# Patient Record
Sex: FEMALE | Race: WHITE | NOT HISPANIC OR LATINO | URBAN - METROPOLITAN AREA
[De-identification: names, ages, dates, MRNs, and addresses within clinical notes are randomized per-mention and may not be internally consistent; named-entity substitution may affect disease eponyms.]

---

## 2020-10-15 ENCOUNTER — OFFICE VISIT (OUTPATIENT)
Dept: URBAN - METROPOLITAN AREA CLINIC 68 | Facility: CLINIC | Age: 65
End: 2020-10-15

## 2021-03-04 ENCOUNTER — OFFICE VISIT (OUTPATIENT)
Dept: URBAN - METROPOLITAN AREA CLINIC 68 | Facility: CLINIC | Age: 66
End: 2021-03-04

## 2021-04-01 ENCOUNTER — OFFICE VISIT (OUTPATIENT)
Dept: URBAN - METROPOLITAN AREA CLINIC 68 | Facility: CLINIC | Age: 66
End: 2021-04-01

## 2021-08-26 ENCOUNTER — OFFICE VISIT (OUTPATIENT)
Dept: URBAN - METROPOLITAN AREA CLINIC 68 | Facility: CLINIC | Age: 66
End: 2021-08-26

## 2021-09-23 ENCOUNTER — OFFICE VISIT (OUTPATIENT)
Dept: URBAN - METROPOLITAN AREA CLINIC 68 | Facility: CLINIC | Age: 66
End: 2021-09-23

## 2022-06-04 ENCOUNTER — TELEPHONE ENCOUNTER (OUTPATIENT)
Dept: URBAN - METROPOLITAN AREA CLINIC 68 | Facility: CLINIC | Age: 67
End: 2022-06-04

## 2022-06-04 RX ORDER — METRONIDAZOLE 500 MG/1
METRONIDAZOLE( 500MG ORAL   ) INACTIVE -HX ENTRY TABLET ORAL
OUTPATIENT
Start: 2021-07-27 | End: 2019-08-01

## 2022-06-04 RX ORDER — PANTOPRAZOLE SODIUM 40 MG/1
PANTOPRAZOLE SODIUM( 40MG ORAL   ) INACTIVE -HX ENTRY TABLET, DELAYED RELEASE ORAL
OUTPATIENT
Start: 2020-09-24 | End: 2021-07-21

## 2022-06-04 RX ORDER — ATENOLOL 25 MG/1
ATENOLOL( 25MG ORAL   ) INACTIVE -HX ENTRY TABLET ORAL
OUTPATIENT
Start: 2020-08-13 | End: 2021-07-21

## 2022-06-04 RX ORDER — CITALOPRAM 40 MG/1
CITALOPRAM HYDROBROMIDE( 40MG ORAL   ) INACTIVE -HX ENTRY TABLET ORAL
OUTPATIENT
Start: 2021-03-16 | End: 2021-07-21

## 2022-06-04 RX ORDER — PREDNISOLONE ACETATE 10 MG/ML
PREDNISOLONE ACETATE( 1% OPHTHALMIC   ) INACTIVE -HX ENTRY SUSPENSION/ DROPS OPHTHALMIC
OUTPATIENT
Start: 2021-07-27 | End: 2019-08-01

## 2022-06-04 RX ORDER — TRIAMTERENE AND HYDROCHLOROTHIAZIDE 37.5; 25 MG/1; MG/1
TRIAMTERENE-HCTZ( 37.5-25MG ORAL   ) INACTIVE -HX ENTRY CAPSULE ORAL
OUTPATIENT
Start: 2021-07-27 | End: 2020-12-03

## 2022-06-04 RX ORDER — BROMFENAC SODIUM 0.7 MG/ML
PROLENSA( 0.07% OPHTHALMIC   ) INACTIVE -HX ENTRY SOLUTION/ DROPS OPHTHALMIC
OUTPATIENT
Start: 2021-07-27 | End: 2019-08-01

## 2022-06-04 RX ORDER — CYCLOBENZAPRINE HYDROCHLORIDE 10 MG/10MG
CYCLOBENZAPRINE HCL( 10MG ORAL   ) INACTIVE -HX ENTRY TABLET ORAL
OUTPATIENT
Start: 2021-07-27 | End: 2020-12-03

## 2022-06-04 RX ORDER — AZELASTINE 137 UG/1
AZELASTINE HCL( 137MCG/SPRAY NASAL   ) INACTIVE -HX ENTRY SPRAY, METERED NASAL
OUTPATIENT
Start: 2020-08-13 | End: 2021-07-21

## 2022-06-04 RX ORDER — CIPROFLOXACIN HYDROCHLORIDE 500 MG/1
CIPROFLOXACIN HCL( 500MG ORAL   ) INACTIVE -HX ENTRY TABLET, FILM COATED ORAL
OUTPATIENT
Start: 2021-07-27 | End: 2019-08-01

## 2022-06-04 RX ORDER — INFLUENZA VIRUS VACCINE 15; 15; 15; 15 UG/.5ML; UG/.5ML; UG/.5ML; UG/.5ML
FLULAVAL QUADRIVALENT( 0.5ML INTRAMUSCULAR   ) INACTIVE -HX ENTRY SUSPENSION INTRAMUSCULAR
OUTPATIENT
Start: 2021-07-27 | End: 2019-08-01

## 2022-06-04 RX ORDER — MONTELUKAST SODIUM 10 MG/1
SINGULAIR( 10MG ORAL   ) INACTIVE -HX ENTRY TABLET, FILM COATED ORAL
OUTPATIENT
Start: 2021-03-16 | End: 2021-07-21

## 2022-06-04 RX ORDER — TRAMADOL HYDROCHLORIDE 50 MG/1
TRAMADOL HCL( 50MG ORAL   ) INACTIVE -HX ENTRY TABLET ORAL
OUTPATIENT
Start: 2020-09-24 | End: 2021-07-21

## 2022-06-04 RX ORDER — INFLUENZA A VIRUS A/VICTORIA/2570/2019 IVR-215 (H1N1) ANTIGEN (PROPIOLACTONE INACTIVATED), INFLUENZA A VIRUS A/DARWIN/6/2021 IVR-227 (H3N2) ANTIGEN (PROPIOLACTONE INACTIVATED), INFLUENZA B VIRUS B/AUSTRIA/1359417/2021 BVR-26 ANTIGEN (PROPIOLACTONE INACTIVATED), INFLUENZA B VIRUS B/PHUKET/3073/2013 BVR-1B ANTIGEN (PROPIOLACTONE INACTIVATED) 15; 15; 15; 15 UG/.5ML; UG/.5ML; UG/.5ML; UG/.5ML
AFLURIA QUADRIVALENT( 0.5ML INTRAMUSCULAR   ) INACTIVE -HX ENTRY INJECTION, SOLUTION INTRAMUSCULAR
OUTPATIENT
Start: 2021-07-27 | End: 2020-05-14

## 2022-06-04 RX ORDER — GATIFLOXACIN 5 MG/ML
GATIFLOXACIN( 0.5% OPHTHALMIC   ) INACTIVE -HX ENTRY SOLUTION/ DROPS OPHTHALMIC
OUTPATIENT
Start: 2021-07-27 | End: 2019-08-01

## 2022-06-04 RX ORDER — LISINOPRIL 10 MG/1
LISINOPRIL( 10MG ORAL   ) INACTIVE -HX ENTRY TABLET ORAL
OUTPATIENT
Start: 2020-09-24 | End: 2021-07-21

## 2022-06-05 ENCOUNTER — TELEPHONE ENCOUNTER (OUTPATIENT)
Dept: URBAN - METROPOLITAN AREA CLINIC 68 | Facility: CLINIC | Age: 67
End: 2022-06-05

## 2022-06-05 RX ORDER — LEVOTHYROXINE SODIUM 0.03 MG/1
LEVOTHYROXINE SODIUM( 25MCG ORAL   ) ACTIVE -HX ENTRY TABLET ORAL
Status: ACTIVE | COMMUNITY
Start: 2021-01-22

## 2022-06-05 RX ORDER — BUPROPION HCL 300 MG
WELLBUTRIN XL( 300MG ORAL   ) ACTIVE -HX ENTRY TABLET, EXTENDED RELEASE 24 HR ORAL
Status: ACTIVE | COMMUNITY
Start: 2021-03-16

## 2022-06-05 RX ORDER — CITALOPRAM 20 MG/1
CITALOPRAM HYDROBROMIDE( 20MG ORAL   ) ACTIVE -HX ENTRY TABLET ORAL
Status: ACTIVE | COMMUNITY
Start: 2021-07-27

## 2022-06-05 RX ORDER — ZOLPIDEM TARTRATE 10 MG/1
ZOLPIDEM TARTRATE( 10MG ORAL   ) ACTIVE -HX ENTRY TABLET, FILM COATED ORAL
Status: ACTIVE | COMMUNITY
Start: 2021-07-27

## 2022-06-05 RX ORDER — ONDANSETRON 8 MG/1
ONDANSETRON( 8MG ORAL   ) ACTIVE -HX ENTRY TABLET, ORALLY DISINTEGRATING ORAL
Status: ACTIVE | COMMUNITY
Start: 2021-07-21

## 2022-06-25 ENCOUNTER — TELEPHONE ENCOUNTER (OUTPATIENT)
Age: 67
End: 2022-06-25

## 2022-06-25 RX ORDER — MONTELUKAST SODIUM 10 MG/1
SINGULAIR( 10MG ORAL   ) INACTIVE -HX ENTRY TABLET, FILM COATED ORAL
OUTPATIENT
Start: 2021-03-16 | End: 2021-07-21

## 2022-06-25 RX ORDER — INFLUENZA VIRUS VACCINE 15; 15; 15; 15 UG/.5ML; UG/.5ML; UG/.5ML; UG/.5ML
FLULAVAL QUADRIVALENT( 0.5ML INTRAMUSCULAR   ) INACTIVE -HX ENTRY SUSPENSION INTRAMUSCULAR
OUTPATIENT
Start: 2021-07-27 | End: 2019-08-01

## 2022-06-25 RX ORDER — PREDNISOLONE ACETATE 10 MG/ML
PREDNISOLONE ACETATE( 1% OPHTHALMIC   ) INACTIVE -HX ENTRY SUSPENSION/ DROPS OPHTHALMIC
OUTPATIENT
Start: 2021-07-27 | End: 2019-08-01

## 2022-06-25 RX ORDER — CITALOPRAM 40 MG/1
CITALOPRAM HYDROBROMIDE( 40MG ORAL   ) INACTIVE -HX ENTRY TABLET ORAL
OUTPATIENT
Start: 2021-03-16 | End: 2021-07-21

## 2022-06-25 RX ORDER — GATIFLOXACIN 5 MG/ML
GATIFLOXACIN( 0.5% OPHTHALMIC   ) INACTIVE -HX ENTRY SOLUTION/ DROPS OPHTHALMIC
OUTPATIENT
Start: 2021-07-27 | End: 2019-08-01

## 2022-06-25 RX ORDER — PANTOPRAZOLE 40 MG/1
PANTOPRAZOLE SODIUM( 40MG ORAL   ) INACTIVE -HX ENTRY TABLET, DELAYED RELEASE ORAL
OUTPATIENT
Start: 2020-09-24 | End: 2021-07-21

## 2022-06-25 RX ORDER — TRIAMTERENE AND HYDROCHLOROTHIAZIDE 37.5; 25 MG/1; MG/1
TRIAMTERENE-HCTZ( 37.5-25MG ORAL   ) INACTIVE -HX ENTRY CAPSULE ORAL
OUTPATIENT
Start: 2021-07-27 | End: 2020-12-03

## 2022-06-25 RX ORDER — BROMFENAC SODIUM 0.7 MG/ML
PROLENSA( 0.07% OPHTHALMIC   ) INACTIVE -HX ENTRY SOLUTION/ DROPS OPHTHALMIC
OUTPATIENT
Start: 2021-07-27 | End: 2019-08-01

## 2022-06-25 RX ORDER — CIPROFLOXACIN HYDROCHLORIDE 500 MG/1
CIPROFLOXACIN HCL( 500MG ORAL   ) INACTIVE -HX ENTRY TABLET, FILM COATED ORAL
OUTPATIENT
Start: 2021-07-27 | End: 2019-08-01

## 2022-06-25 RX ORDER — LISINOPRIL 10 MG/1
LISINOPRIL( 10MG ORAL   ) INACTIVE -HX ENTRY TABLET ORAL
OUTPATIENT
Start: 2020-09-24 | End: 2021-07-21

## 2022-06-25 RX ORDER — METRONIDAZOLE 500 MG/1
METRONIDAZOLE( 500MG ORAL   ) INACTIVE -HX ENTRY TABLET ORAL
OUTPATIENT
Start: 2021-07-27 | End: 2019-08-01

## 2022-06-25 RX ORDER — AZELASTINE HYDROCHLORIDE 137 UG/1
AZELASTINE HCL( 137MCG/SPRAY NASAL   ) INACTIVE -HX ENTRY SPRAY, METERED NASAL
OUTPATIENT
Start: 2020-08-13 | End: 2021-07-21

## 2022-06-25 RX ORDER — TRAMADOL HYDROCHLORIDE 50 MG/1
TRAMADOL HCL( 50MG ORAL   ) INACTIVE -HX ENTRY TABLET ORAL
OUTPATIENT
Start: 2020-09-24 | End: 2021-07-21

## 2022-06-25 RX ORDER — ATENOLOL 25 MG/1
ATENOLOL( 25MG ORAL   ) INACTIVE -HX ENTRY TABLET ORAL
OUTPATIENT
Start: 2020-08-13 | End: 2021-07-21

## 2022-06-25 RX ORDER — INFLUENZA A VIRUS A/VICTORIA/2570/2019 IVR-215 (H1N1) ANTIGEN (PROPIOLACTONE INACTIVATED), INFLUENZA A VIRUS A/DARWIN/6/2021 IVR-227 (H3N2) ANTIGEN (PROPIOLACTONE INACTIVATED), INFLUENZA B VIRUS B/AUSTRIA/1359417/2021 BVR-26 ANTIGEN (PROPIOLACTONE INACTIVATED), INFLUENZA B VIRUS B/PHUKET/3073/2013 BVR-1B ANTIGEN (PROPIOLACTONE INACTIVATED) 15; 15; 15; 15 UG/.5ML; UG/.5ML; UG/.5ML; UG/.5ML
AFLURIA QUADRIVALENT( 0.5ML INTRAMUSCULAR   ) INACTIVE -HX ENTRY INJECTION, SOLUTION INTRAMUSCULAR
OUTPATIENT
Start: 2021-07-27 | End: 2020-05-14

## 2022-06-25 RX ORDER — CYCLOBENZAPRINE HYDROCHLORIDE 10 MG/1
CYCLOBENZAPRINE HCL( 10MG ORAL   ) INACTIVE -HX ENTRY TABLET, FILM COATED ORAL
OUTPATIENT
Start: 2021-07-27 | End: 2020-12-03

## 2022-06-26 ENCOUNTER — TELEPHONE ENCOUNTER (OUTPATIENT)
Age: 67
End: 2022-06-26

## 2022-06-26 RX ORDER — ZOLPIDEM TARTRATE 10 MG/1
ZOLPIDEM TARTRATE( 10MG ORAL   ) ACTIVE -HX ENTRY TABLET ORAL
Status: ACTIVE | COMMUNITY
Start: 2021-07-27

## 2022-06-26 RX ORDER — ONDANSETRON 8 MG/1
ONDANSETRON( 8MG ORAL   ) ACTIVE -HX ENTRY TABLET, ORALLY DISINTEGRATING ORAL
Status: ACTIVE | COMMUNITY
Start: 2021-07-21

## 2022-06-26 RX ORDER — CITALOPRAM 20 MG/1
CITALOPRAM HYDROBROMIDE( 20MG ORAL   ) ACTIVE -HX ENTRY TABLET ORAL
Status: ACTIVE | COMMUNITY
Start: 2021-07-27

## 2022-06-26 RX ORDER — LEVOTHYROXINE SODIUM 25 UG/1
LEVOTHYROXINE SODIUM( 25MCG ORAL   ) ACTIVE -HX ENTRY TABLET ORAL
Status: ACTIVE | COMMUNITY
Start: 2021-01-22

## 2022-06-26 RX ORDER — BUPROPION HCL 300 MG
WELLBUTRIN XL( 300MG ORAL   ) ACTIVE -HX ENTRY TABLET, EXTENDED RELEASE 24 HR ORAL
Status: ACTIVE | COMMUNITY
Start: 2021-03-16

## 2025-06-07 ENCOUNTER — APPOINTMENT (OUTPATIENT)
Dept: CT IMAGING | Facility: HOSPITAL | Age: 70
End: 2025-06-07
Payer: MEDICARE

## 2025-06-07 ENCOUNTER — HOSPITAL ENCOUNTER (EMERGENCY)
Facility: HOSPITAL | Age: 70
Discharge: HOME | End: 2025-06-07
Attending: EMERGENCY MEDICINE
Payer: MEDICARE

## 2025-06-07 VITALS
RESPIRATION RATE: 14 BRPM | BODY MASS INDEX: 20.09 KG/M2 | HEIGHT: 66 IN | DIASTOLIC BLOOD PRESSURE: 79 MMHG | OXYGEN SATURATION: 97 % | SYSTOLIC BLOOD PRESSURE: 139 MMHG | HEART RATE: 61 BPM | WEIGHT: 125 LBS | TEMPERATURE: 98 F

## 2025-06-07 DIAGNOSIS — S09.90XA CLOSED HEAD INJURY WITHOUT LOSS OF CONSCIOUSNESS, INITIAL ENCOUNTER: Primary | ICD-10-CM

## 2025-06-07 PROCEDURE — 70450 CT HEAD/BRAIN W/O DYE: CPT

## 2025-06-07 PROCEDURE — 72125 CT NECK SPINE W/O DYE: CPT

## 2025-06-07 PROCEDURE — 99284 EMERGENCY DEPT VISIT MOD MDM: CPT

## 2025-06-08 NOTE — ED PROVIDER NOTES
MD ATTESTATION NOTE    SHARED VISIT: This visit was performed by BOTH a physician and an APC. The substantive portion of the medical decision making was performed by this attesting physician who made or approved the management plan and takes responsibility for patient management. All studies in the APC note (if performed) were independently interpreted by me.     The [facility]ED[facility] and I have discussed this patient's history, physical exam, and treatment plan.  I have reviewed the documentation and affirm the documentation and agree with the treatment and plan.  The attached note describes my personal findings.      Independent Historians: Patient and family    A complete HPI/ROS/PMH/PSH/SH/FH are unobtainable due to: NOne    Chronic or social conditions impacting patient care (social determinants of health): None    Leigh June is a 70 y.o. female who presents to the ED c/o acute fall with injury to back of head. Pt was getting into a car when it moved causing her to fall backward striking her head on concrete. Pt with no loc, no headache, no n/v, no vision changes, no neck pain.  Pt not on any anticoagulation.          On exam:  GENERAL: pleasant cooperative and well appearing f, conversant, alert, no acute distress  SKIN: Warm, dry  HENT: Normocephalic  EYES: no scleral icterus, eomi  RESPIRATORY: relaxed breathing  NEURO: alert, face symmetric, speech clear and fluent, moves all extremities, follows commands                                                             Labs  No results found for this or any previous visit (from the past 24 hours).    Radiology  CT Cervical Spine Without Contrast  Result Date: 6/7/2025  CT OF THE CERVICAL SPINE  HISTORY: Fall with head injury  COMPARISON: None available.  TECHNIQUE: Axial CT imaging was obtained through the brain. No IV contrast was administered.  FINDINGS: No acute fracture or subluxation of cervical spine is seen. Intervertebral disc spaces appear relatively  well-maintained. There is some minimal retrolisthesis of C5 on C6. Images through the lung apices demonstrate some minimal biapical scarring. There is no prevertebral soft tissue swelling. There is some mild canal stenosis at C4-C5. Neural foramina appear well-maintained.      No acute fracture or subluxation identified.  Radiation dose reduction techniques were utilized, including automated exposure control and exposure modulation based on body size.   This report was finalized on 6/7/2025 10:49 PM by Dr. Nataly Jones M.D on Workstation: [id number]      CT Head Without Contrast  Result Date: 6/7/2025  CT OF THE HEAD WITHOUT CONTRAST  HISTORY: Fall  COMPARISON: None available.  TECHNIQUE: Axial CT imaging was obtained through the brain. No IV contrast was administered.  FINDINGS: No acute intracranial hemorrhage is seen. There is atrophy. There is periventricular and deep white matter microangiopathic change. There is no midline shift or mass effect. Paranasal sinuses and mastoid air cells are essentially clear.      No acute intracranial findings.  Radiation dose reduction techniques were utilized, including automated exposure control and exposure modulation based on body size.   This report was finalized on 6/7/2025 10:44 PM by Dr. Nataly Jones M.D on Workstation: [id number]        Medical Decision Making:  ED Course as of 06/07/25 2330   Sat Jun 07, 2025   2229 CT Head Without Contrast  Radiology study independently interpreted by me and my findings are no large intracranial hemorrhage.   [DC]   2310 Discussed imaging results with patient and family at bedside. Return precautions discussed. [DC]      ED Course User Index  [DC] Alejandra oRman PA       MDM: Pt with a fall on concrete striking back of head.  No anticoagulation, no neuro deficits on exam, no loc, no n/v but given patient age, plan for ct scan to rule out ICH or skull fx.    Procedures:  Procedures        PPE: I followed [facility]  protocols for proper PPE based on patient presentation including use of N95 mask for suspected infectious respiratory conditions.  Proper hand hygiene was performed both before and after the patient encounter.          Diagnosis  Final diagnoses:   Closed head injury without loss of consciousness, initial encounter       Note Disclaimer: At [facility], we believe that sharing information builds trust and better relationships. You are receiving this note because you recently visited [facility]. It is possible you will see health information before a provider has talked with you about it. This kind of information can be easy to misunderstand. To help you fully understand what it means for your health, we urge you to discuss this note with your provider.       Virgie Escamilla MD  06/08/25 [id number]

## 2025-06-08 NOTE — ED PROVIDER NOTES
EMERGENCY DEPARTMENT ENCOUNTER      PCP: Provider, No Known  Patient Care Team:  Provider, No Known as PCP - General   Independent Historians: Patient, family at bedside    HPI:  Chief Complaint: Fall, head injury   A complete HPI/ROS/PMH/PSH/SH/FH are unobtainable due to: None    Chronic or social conditions impacting patient care (social determinants of health): None    Context: Leigh June is a 70 y.o. female who presents to the ED c/o acute fall, head injury. Pt reports she was getting into the car when it started moving causing her to fall. She reports she hit the back of her head on the concrete. Denies LOC. No nausea, vomiting. No neck pain, numbness or weakness to the extremities. No blood thinners.    Review of prior external notes and/or external test results outside of this encounter: MRI left knee on 6/12/24 showed medial meniscus tear. Chronic MCL sprain.  MRI right knee on same date showed meniscal tear.      PAST MEDICAL HISTORY  Active Ambulatory Problems     Diagnosis Date Noted    No Active Ambulatory Problems     Resolved Ambulatory Problems     Diagnosis Date Noted    No Resolved Ambulatory Problems     No Additional Past Medical History       The patient has started, but not completed, their COVID-19 vaccination series.    PAST SURGICAL HISTORY  History reviewed. No pertinent surgical history.      FAMILY HISTORY  History reviewed. No pertinent family history.      SOCIAL HISTORY  Social History     Socioeconomic History    Marital status:    Tobacco Use    Smoking status: Never    Smokeless tobacco: Never   Substance and Sexual Activity    Alcohol use: Yes     Comment: Socially    Drug use: Never         ALLERGIES  Patient has no known allergies.        REVIEW OF SYSTEMS  Review of Systems   Gastrointestinal:  Negative for nausea and vomiting.   Musculoskeletal:  Negative for back pain and neck pain.   Neurological:  Negative for syncope.        All systems reviewed and negative  except for those discussed in HPI.       PHYSICAL EXAM    I have reviewed the triage vital signs and nursing notes.    ED Triage Vitals   Temp Heart Rate Resp BP SpO2   06/07/25 2129 06/07/25 2129 06/07/25 2129 06/07/25 2132 06/07/25 2129   98 °F (36.7 °C) 61 18 141/82 97 %      Temp src Heart Rate Source Patient Position BP Location FiO2 (%)   -- -- 06/07/25 2132 06/07/25 2132 --     Sitting Right arm        Physical Exam  GENERAL: alert, no acute distress  SKIN: Warm, dry  HENT: Normocephalic, small hematoma to the right posterior scalp  EYES: no scleral icterus  CV: regular rhythm, regular rate  RESPIRATORY: normal effort, lungs clear  ABDOMEN: soft, nontender, nondistended  MUSCULOSKELETAL: no deformity, no midline cervical ttp  NEURO: alert, moves all extremities, follows commands, no focal deficits          LAB RESULTS  No results found for this or any previous visit (from the past 24 hours).    Ordered the above labs and independently reviewed and interpreted the results.        RADIOLOGY  CT Cervical Spine Without Contrast  Result Date: 6/7/2025  CT OF THE CERVICAL SPINE  HISTORY: Fall with head injury  COMPARISON: None available.  TECHNIQUE: Axial CT imaging was obtained through the brain. No IV contrast was administered.  FINDINGS: No acute fracture or subluxation of cervical spine is seen. Intervertebral disc spaces appear relatively well-maintained. There is some minimal retrolisthesis of C5 on C6. Images through the lung apices demonstrate some minimal biapical scarring. There is no prevertebral soft tissue swelling. There is some mild canal stenosis at C4-C5. Neural foramina appear well-maintained.      No acute fracture or subluxation identified.  Radiation dose reduction techniques were utilized, including automated exposure control and exposure modulation based on body size.   This report was finalized on 6/7/2025 10:49 PM by Dr. Nataly Jones M.D on Workstation: [id number]      CT Head  Without Contrast  Result Date: 6/7/2025  CT OF THE HEAD WITHOUT CONTRAST  HISTORY: Fall  COMPARISON: None available.  TECHNIQUE: Axial CT imaging was obtained through the brain. No IV contrast was administered.  FINDINGS: No acute intracranial hemorrhage is seen. There is atrophy. There is periventricular and deep white matter microangiopathic change. There is no midline shift or mass effect. Paranasal sinuses and mastoid air cells are essentially clear.      No acute intracranial findings.  Radiation dose reduction techniques were utilized, including automated exposure control and exposure modulation based on body size.   This report was finalized on 6/7/2025 10:44 PM by Dr. Nataly Jones M.D on Workstation: [id number]        I ordered the above noted radiological studies. Independently reviewed and interpreted by me.  See dictation for official radiology interpretation.      PROCEDURES    Procedures      MEDICATIONS GIVEN IN ER    Medications - No data to display      PROGRESS, DATA ANALYSIS, CONSULTS, AND MEDICAL DECISION MAKING    All labs have been independently reviewed and interpreted by me.  All radiology studies have been independently reviewed and interpreted by me and discussed with radiologist dictating the report.   EKG's independently reviewed and interpreted by me.  Discussion below represents my analysis of pertinent findings related to patient's condition, differential diagnosis, treatment plan and final disposition.    Differential diagnosis: hematoma, concussion, intracranial hemorrhage, fracture    ED Course as of 06/07/25 2313   Sat Jun 07, 2025 2229 CT Head Without Contrast  Radiology study independently interpreted by me and my findings are no large intracranial hemorrhage.   [DC]   2310 Discussed imaging results with patient and family at bedside. Return precautions discussed. [DC]      ED Course User Index  [DC] Alejandra Roman PA             AS OF 23:13 EDT VITALS:    BP -  141/76  HR - 54  TEMP - 98 °F (36.7 °C)  O2 SATS - 99%        DIAGNOSIS  Final diagnoses:   Closed head injury without loss of consciousness, initial encounter         DISPOSITION  ED Disposition       ED Disposition   Discharge    Condition   Stable    Comment   --                  Note Disclaimer: At [facility], we believe that sharing information builds trust and better relationships. You are receiving this note because you recently visited [facility]. It is possible you will see health information before a provider has talked with you about it. This kind of information can be easy to misunderstand. To help you fully understand what it means for your health, we urge you to discuss this note with your provider.         Alejandra Roman PA  06/07/25 [id number]